# Patient Record
Sex: FEMALE | URBAN - METROPOLITAN AREA
[De-identification: names, ages, dates, MRNs, and addresses within clinical notes are randomized per-mention and may not be internally consistent; named-entity substitution may affect disease eponyms.]

---

## 2020-09-22 ENCOUNTER — OFFICE VISIT (OUTPATIENT)
Dept: URBAN - METROPOLITAN AREA CLINIC 37 | Facility: CLINIC | Age: 25
End: 2020-09-22

## 2020-09-22 ENCOUNTER — LAB OUTSIDE AN ENCOUNTER (OUTPATIENT)
Dept: URBAN - METROPOLITAN AREA CLINIC 37 | Facility: CLINIC | Age: 25
End: 2020-09-22

## 2020-09-22 VITALS — BODY MASS INDEX: 16.48 KG/M2 | HEIGHT: 63 IN | WEIGHT: 93 LBS

## 2020-09-22 PROBLEM — 235595009: Status: ACTIVE | Noted: 2020-09-22

## 2020-09-22 RX ORDER — PANTOPRAZOLE SODIUM 40 MG/1
1 TABLET TABLET, DELAYED RELEASE ORAL ONCE A DAY
Qty: 30 | Status: ON HOLD | COMMUNITY

## 2020-09-22 RX ORDER — FLUTICASONE PROPIONATE 50 UG/1
1 SPRAY IN EACH NOSTRIL SPRAY, METERED NASAL ONCE A DAY
Status: ACTIVE | COMMUNITY

## 2020-09-22 RX ORDER — EPINEPHRINE 0.15 MG/.3ML
AS DIRECTED INJECTION INTRAMUSCULAR; SUBCUTANEOUS
Status: ON HOLD | COMMUNITY

## 2020-09-22 RX ORDER — ASCORBIC ACID 1000 MG
1 TABLET TABLET ORAL ONCE A DAY
Qty: 30 | Status: ACTIVE | COMMUNITY

## 2020-09-22 RX ORDER — LORATADINE 10 MG
1 TABLET TABLET ORAL PRN
Status: ACTIVE | COMMUNITY

## 2020-09-22 NOTE — HPI-MIGRATED HPI
Interim investigations : Labs done on: ->  * 09/01/2020, ordered by PCP:  - CBC: WBC: 6.8; RBC: 4.52; Hgb: 13.6; Hct: 41.9; Plt: 234 - CMP: Glu: 75; BUN: 16; Cr; 0.4 (L); Na: 139; K: 4.1; Alb: 4.0; AST: 11 (L); ALT: 9; ALP: 54; Tbili: 0.3;   Initial consultation : Patient is here for -> Dyspepsia and Abdominal pain Onset of symptoms: more than 10 years but intermittently, gets worsen recently Characteristics: epigastric pain, acid reflux, hyperactive bowel sounds Aggravating factors: none  Associated symptoms: decreased appetitie  Relieving factors: none  Medications tried: Pantoprazole 40 mg daily prescribed by PCP on 09/01/2020 Tests/evaluations done previously: UBT by PCP early Jan 2020 , was positive for H. pylori and was treated for 2 weeks. She felt better, but UBT was positive again. She was treated the second round of 10 days . Repeat test was Negative around April 2020. She still had symptoms; therefore,  she was prescribed Pantoprazole 40 mg daily.  Denies prior EGD;

## 2020-10-12 ENCOUNTER — OFFICE VISIT (OUTPATIENT)
Dept: URBAN - METROPOLITAN AREA MEDICAL CENTER 10 | Facility: MEDICAL CENTER | Age: 25
End: 2020-10-12

## 2020-10-29 ENCOUNTER — DASHBOARD ENCOUNTERS (OUTPATIENT)
Age: 25
End: 2020-10-29

## 2020-11-03 ENCOUNTER — OFFICE VISIT (OUTPATIENT)
Dept: URBAN - METROPOLITAN AREA CLINIC 37 | Facility: CLINIC | Age: 25
End: 2020-11-03

## 2020-11-03 PROBLEM — 15627741000119108: Status: ACTIVE | Noted: 2020-09-22

## 2020-11-03 PROBLEM — 266433003: Status: ACTIVE | Noted: 2020-11-03

## 2020-11-03 RX ORDER — EPINEPHRINE 0.15 MG/.3ML
AS DIRECTED INJECTION INTRAMUSCULAR; SUBCUTANEOUS
Status: ON HOLD | COMMUNITY

## 2020-11-03 RX ORDER — OMEPRAZOLE 40 MG/1
1 CAPSULE CAPSULE, DELAYED RELEASE ORAL QAM
Qty: 30 | Refills: 2 | OUTPATIENT
Start: 2020-11-03

## 2020-11-03 NOTE — HPI-MIGRATED HPI
Post-op OV : After EGD on -> 10/12/2020. Normal esophagus. GE junction appeared irregular and was found 36 cm from incisors. LA Grade B esophagitis with no bleeding was found at the GE junction. Esophagus bxx showed squamouse type mucosa with histologic changes consistent with mild reflux esophagitis. Stomach bxx showed moderate chronic gastritis in the gastric body and antrum. Negative for H. pylori and IM  EGD was done due to dyspepsia/epigastric pain and hx of positive for H. pylori after UBT done by PCP. Patient was advised to continued with Pantoprazole prn prescribed by PCP after the procedure;   Post-op OV : Patient reports of current symptoms -> Has BM daily;